# Patient Record
Sex: FEMALE | Race: OTHER | HISPANIC OR LATINO | URBAN - METROPOLITAN AREA
[De-identification: names, ages, dates, MRNs, and addresses within clinical notes are randomized per-mention and may not be internally consistent; named-entity substitution may affect disease eponyms.]

---

## 2020-07-18 ENCOUNTER — INPATIENT (INPATIENT)
Facility: HOSPITAL | Age: 35
LOS: 2 days | Discharge: HOME | End: 2020-07-21
Attending: OBSTETRICS & GYNECOLOGY | Admitting: OBSTETRICS & GYNECOLOGY
Payer: COMMERCIAL

## 2020-07-18 DIAGNOSIS — Z3A.00 WEEKS OF GESTATION OF PREGNANCY NOT SPECIFIED: ICD-10-CM

## 2020-07-18 DIAGNOSIS — O26.899 OTHER SPECIFIED PREGNANCY RELATED CONDITIONS, UNSPECIFIED TRIMESTER: ICD-10-CM

## 2020-07-18 LAB
APTT BLD: 28.9 SEC — SIGNIFICANT CHANGE UP (ref 27.5–35.5)
HCT VFR BLD CALC: 35.1 % — SIGNIFICANT CHANGE UP (ref 34.5–45)
HGB BLD-MCNC: 12 G/DL — SIGNIFICANT CHANGE UP (ref 11.5–15.5)
INR BLD: 0.98 RATIO — SIGNIFICANT CHANGE UP (ref 0.88–1.16)
MCHC RBC-ENTMCNC: 29.9 PG — SIGNIFICANT CHANGE UP (ref 27–34)
MCHC RBC-ENTMCNC: 34.2 GM/DL — SIGNIFICANT CHANGE UP (ref 32–36)
MCV RBC AUTO: 87.5 FL — SIGNIFICANT CHANGE UP (ref 80–100)
NRBC # BLD: 0 /100 WBCS — SIGNIFICANT CHANGE UP (ref 0–0)
PLATELET # BLD AUTO: 288 K/UL — SIGNIFICANT CHANGE UP (ref 150–400)
PROTHROM AB SERPL-ACNC: 11.5 SEC — SIGNIFICANT CHANGE UP (ref 10.6–13.6)
RBC # BLD: 4.01 M/UL — SIGNIFICANT CHANGE UP (ref 3.8–5.2)
RBC # FLD: 14.2 % — SIGNIFICANT CHANGE UP (ref 10.3–14.5)
WBC # BLD: 13.16 K/UL — HIGH (ref 3.8–10.5)
WBC # FLD AUTO: 13.16 K/UL — HIGH (ref 3.8–10.5)

## 2020-07-18 RX ORDER — SODIUM CHLORIDE 9 MG/ML
500 INJECTION INTRAMUSCULAR; INTRAVENOUS; SUBCUTANEOUS ONCE
Refills: 0 | Status: COMPLETED | OUTPATIENT
Start: 2020-07-18 | End: 2020-07-18

## 2020-07-18 RX ORDER — SODIUM CHLORIDE 9 MG/ML
3 INJECTION INTRAMUSCULAR; INTRAVENOUS; SUBCUTANEOUS EVERY 8 HOURS
Refills: 0 | Status: DISCONTINUED | OUTPATIENT
Start: 2020-07-18 | End: 2020-07-19

## 2020-07-18 RX ORDER — SODIUM CHLORIDE 9 MG/ML
1000 INJECTION INTRAMUSCULAR; INTRAVENOUS; SUBCUTANEOUS
Refills: 0 | Status: DISCONTINUED | OUTPATIENT
Start: 2020-07-18 | End: 2020-07-19

## 2020-07-18 RX ADMIN — SODIUM CHLORIDE 1500 MILLILITER(S): 9 INJECTION INTRAMUSCULAR; INTRAVENOUS; SUBCUTANEOUS at 22:36

## 2020-07-18 RX ADMIN — SODIUM CHLORIDE 200 MILLILITER(S): 9 INJECTION INTRAMUSCULAR; INTRAVENOUS; SUBCUTANEOUS at 22:56

## 2020-07-19 VITALS — WEIGHT: 224.87 LBS | HEIGHT: 68 IN

## 2020-07-19 DIAGNOSIS — Z34.80 ENCOUNTER FOR SUPERVISION OF OTHER NORMAL PREGNANCY, UNSPECIFIED TRIMESTER: ICD-10-CM

## 2020-07-19 LAB
ALBUMIN SERPL ELPH-MCNC: 3 G/DL — LOW (ref 3.5–5)
ALP SERPL-CCNC: 197 U/L — HIGH (ref 40–120)
ALT FLD-CCNC: 22 U/L DA — SIGNIFICANT CHANGE UP (ref 10–60)
ANION GAP SERPL CALC-SCNC: 8 MMOL/L — SIGNIFICANT CHANGE UP (ref 5–17)
AST SERPL-CCNC: 16 U/L — SIGNIFICANT CHANGE UP (ref 10–40)
BILIRUB SERPL-MCNC: 0.2 MG/DL — SIGNIFICANT CHANGE UP (ref 0.2–1.2)
BLD GP AB SCN SERPL QL: SIGNIFICANT CHANGE UP
BUN SERPL-MCNC: 10 MG/DL — SIGNIFICANT CHANGE UP (ref 7–18)
CALCIUM SERPL-MCNC: 9.4 MG/DL — SIGNIFICANT CHANGE UP (ref 8.4–10.5)
CHLORIDE SERPL-SCNC: 108 MMOL/L — SIGNIFICANT CHANGE UP (ref 96–108)
CO2 SERPL-SCNC: 22 MMOL/L — SIGNIFICANT CHANGE UP (ref 22–31)
CREAT ?TM UR-MCNC: 101 MG/DL — SIGNIFICANT CHANGE UP
CREAT SERPL-MCNC: 0.68 MG/DL — SIGNIFICANT CHANGE UP (ref 0.5–1.3)
GLUCOSE SERPL-MCNC: 96 MG/DL — SIGNIFICANT CHANGE UP (ref 70–99)
LDH SERPL L TO P-CCNC: 246 U/L — HIGH (ref 120–225)
POTASSIUM SERPL-MCNC: 3.8 MMOL/L — SIGNIFICANT CHANGE UP (ref 3.5–5.3)
POTASSIUM SERPL-SCNC: 3.8 MMOL/L — SIGNIFICANT CHANGE UP (ref 3.5–5.3)
PROT SERPL-MCNC: 7.5 G/DL — SIGNIFICANT CHANGE UP (ref 6–8.3)
SARS-COV-2 RNA SPEC QL NAA+PROBE: SIGNIFICANT CHANGE UP
SODIUM SERPL-SCNC: 138 MMOL/L — SIGNIFICANT CHANGE UP (ref 135–145)
URATE SERPL-MCNC: 5.8 MG/DL — SIGNIFICANT CHANGE UP (ref 2.5–7)

## 2020-07-19 PROCEDURE — 88307 TISSUE EXAM BY PATHOLOGIST: CPT | Mod: 26

## 2020-07-19 PROCEDURE — 74018 RADEX ABDOMEN 1 VIEW: CPT | Mod: 26

## 2020-07-19 RX ORDER — OXYCODONE HYDROCHLORIDE 5 MG/1
5 TABLET ORAL
Refills: 0 | Status: COMPLETED | OUTPATIENT
Start: 2020-07-19 | End: 2020-07-26

## 2020-07-19 RX ORDER — KETOROLAC TROMETHAMINE 30 MG/ML
30 SYRINGE (ML) INJECTION EVERY 6 HOURS
Refills: 0 | Status: DISCONTINUED | OUTPATIENT
Start: 2020-07-19 | End: 2020-07-19

## 2020-07-19 RX ORDER — LANOLIN
1 OINTMENT (GRAM) TOPICAL EVERY 6 HOURS
Refills: 0 | Status: DISCONTINUED | OUTPATIENT
Start: 2020-07-19 | End: 2020-07-21

## 2020-07-19 RX ORDER — IBUPROFEN 200 MG
600 TABLET ORAL EVERY 6 HOURS
Refills: 0 | Status: DISCONTINUED | OUTPATIENT
Start: 2020-07-20 | End: 2020-07-21

## 2020-07-19 RX ORDER — SIMETHICONE 80 MG/1
80 TABLET, CHEWABLE ORAL EVERY 4 HOURS
Refills: 0 | Status: DISCONTINUED | OUTPATIENT
Start: 2020-07-19 | End: 2020-07-21

## 2020-07-19 RX ORDER — TETANUS TOXOID, REDUCED DIPHTHERIA TOXOID AND ACELLULAR PERTUSSIS VACCINE, ADSORBED 5; 2.5; 8; 8; 2.5 [IU]/.5ML; [IU]/.5ML; UG/.5ML; UG/.5ML; UG/.5ML
0.5 SUSPENSION INTRAMUSCULAR ONCE
Refills: 0 | Status: DISCONTINUED | OUTPATIENT
Start: 2020-07-19 | End: 2020-07-21

## 2020-07-19 RX ORDER — CITRIC ACID/SODIUM CITRATE 300-500 MG
15 SOLUTION, ORAL ORAL EVERY 6 HOURS
Refills: 0 | Status: DISCONTINUED | OUTPATIENT
Start: 2020-07-19 | End: 2020-07-19

## 2020-07-19 RX ORDER — ACETAMINOPHEN 500 MG
975 TABLET ORAL
Refills: 0 | Status: DISCONTINUED | OUTPATIENT
Start: 2020-07-19 | End: 2020-07-21

## 2020-07-19 RX ORDER — MAGNESIUM HYDROXIDE 400 MG/1
30 TABLET, CHEWABLE ORAL
Refills: 0 | Status: DISCONTINUED | OUTPATIENT
Start: 2020-07-19 | End: 2020-07-21

## 2020-07-19 RX ORDER — ACETAMINOPHEN 500 MG
1000 TABLET ORAL ONCE
Refills: 0 | Status: COMPLETED | OUTPATIENT
Start: 2020-07-19 | End: 2020-07-19

## 2020-07-19 RX ORDER — DIPHENHYDRAMINE HCL 50 MG
25 CAPSULE ORAL EVERY 6 HOURS
Refills: 0 | Status: DISCONTINUED | OUTPATIENT
Start: 2020-07-19 | End: 2020-07-21

## 2020-07-19 RX ORDER — HEPARIN SODIUM 5000 [USP'U]/ML
5000 INJECTION INTRAVENOUS; SUBCUTANEOUS EVERY 12 HOURS
Refills: 0 | Status: DISCONTINUED | OUTPATIENT
Start: 2020-07-19 | End: 2020-07-19

## 2020-07-19 RX ORDER — HEPARIN SODIUM 5000 [USP'U]/ML
5000 INJECTION INTRAVENOUS; SUBCUTANEOUS EVERY 12 HOURS
Refills: 0 | Status: DISCONTINUED | OUTPATIENT
Start: 2020-07-19 | End: 2020-07-21

## 2020-07-19 RX ORDER — AZITHROMYCIN 500 MG/1
500 TABLET, FILM COATED ORAL ONCE
Refills: 0 | Status: COMPLETED | OUTPATIENT
Start: 2020-07-19 | End: 2020-07-19

## 2020-07-19 RX ORDER — OXYTOCIN 10 UNIT/ML
333.33 VIAL (ML) INJECTION
Qty: 20 | Refills: 0 | Status: DISCONTINUED | OUTPATIENT
Start: 2020-07-19 | End: 2020-07-21

## 2020-07-19 RX ORDER — OXYCODONE HYDROCHLORIDE 5 MG/1
5 TABLET ORAL
Refills: 0 | Status: DISCONTINUED | OUTPATIENT
Start: 2020-07-19 | End: 2020-07-21

## 2020-07-19 RX ORDER — SODIUM CHLORIDE 9 MG/ML
1000 INJECTION, SOLUTION INTRAVENOUS
Refills: 0 | Status: DISCONTINUED | OUTPATIENT
Start: 2020-07-19 | End: 2020-07-19

## 2020-07-19 RX ORDER — OXYCODONE HYDROCHLORIDE 5 MG/1
5 TABLET ORAL ONCE
Refills: 0 | Status: DISCONTINUED | OUTPATIENT
Start: 2020-07-19 | End: 2020-07-21

## 2020-07-19 RX ORDER — OXYTOCIN 10 UNIT/ML
2 VIAL (ML) INJECTION
Qty: 30 | Refills: 0 | Status: DISCONTINUED | OUTPATIENT
Start: 2020-07-19 | End: 2020-07-19

## 2020-07-19 RX ORDER — IBUPROFEN 200 MG
600 TABLET ORAL EVERY 6 HOURS
Refills: 0 | Status: COMPLETED | OUTPATIENT
Start: 2020-07-19 | End: 2021-06-17

## 2020-07-19 RX ORDER — SODIUM CHLORIDE 9 MG/ML
1000 INJECTION, SOLUTION INTRAVENOUS
Refills: 0 | Status: DISCONTINUED | OUTPATIENT
Start: 2020-07-19 | End: 2020-07-20

## 2020-07-19 RX ADMIN — SIMETHICONE 80 MILLIGRAM(S): 80 TABLET, CHEWABLE ORAL at 23:55

## 2020-07-19 RX ADMIN — Medication 975 MILLIGRAM(S): at 14:53

## 2020-07-19 RX ADMIN — Medication 1 TABLET(S): at 13:18

## 2020-07-19 RX ADMIN — AZITHROMYCIN 255 MILLIGRAM(S): 500 TABLET, FILM COATED ORAL at 06:38

## 2020-07-19 RX ADMIN — SIMETHICONE 80 MILLIGRAM(S): 80 TABLET, CHEWABLE ORAL at 18:22

## 2020-07-19 RX ADMIN — Medication 975 MILLIGRAM(S): at 20:45

## 2020-07-19 RX ADMIN — Medication 30 MILLIGRAM(S): at 07:15

## 2020-07-19 RX ADMIN — Medication 1000 MILLIUNIT(S)/MIN: at 03:59

## 2020-07-19 RX ADMIN — Medication 1000 MILLIGRAM(S): at 07:08

## 2020-07-19 RX ADMIN — Medication 400 MILLIGRAM(S): at 05:15

## 2020-07-19 RX ADMIN — MAGNESIUM HYDROXIDE 30 MILLILITER(S): 400 TABLET, CHEWABLE ORAL at 20:52

## 2020-07-19 RX ADMIN — Medication 30 MILLIGRAM(S): at 13:17

## 2020-07-19 RX ADMIN — SIMETHICONE 80 MILLIGRAM(S): 80 TABLET, CHEWABLE ORAL at 13:17

## 2020-07-19 RX ADMIN — Medication 30 MILLIGRAM(S): at 06:17

## 2020-07-19 RX ADMIN — Medication 30 MILLIGRAM(S): at 13:45

## 2020-07-19 RX ADMIN — Medication 975 MILLIGRAM(S): at 15:30

## 2020-07-19 RX ADMIN — Medication 30 MILLIGRAM(S): at 23:54

## 2020-07-19 RX ADMIN — Medication 975 MILLIGRAM(S): at 21:45

## 2020-07-19 RX ADMIN — HEPARIN SODIUM 5000 UNIT(S): 5000 INJECTION INTRAVENOUS; SUBCUTANEOUS at 14:53

## 2020-07-19 RX ADMIN — SODIUM CHLORIDE 125 MILLILITER(S): 9 INJECTION, SOLUTION INTRAVENOUS at 00:48

## 2020-07-19 RX ADMIN — Medication 30 MILLIGRAM(S): at 18:22

## 2020-07-19 RX ADMIN — Medication 30 MILLIGRAM(S): at 19:00

## 2020-07-19 NOTE — CHART NOTE - NSCHARTNOTEFT_GEN_A_CORE
Pt seen at bedside offers no complaints. Denies n/v, CP; SOB; palpitations; or dizziness    T(C): 36.7 (07-19-20 @ 05:42), Max: 37 (07-19-20 @ 03:30)  HR: 88 (07-19-20 @ 05:42) (76 - 92)  BP: 127/66 (07-19-20 @ 05:42) (115/56 - 144/65)  RR: 18 (07-19-20 @ 05:42) (18 - 18)  SpO2: 96% (07-19-20 @ 05:42) (96% - 99%)    abd: soft/nt, fundus firm, dressing in place C/D/I  pelvic: minimal lochia  ext: venodynes in place; no calf pain    A/P: POD #0 s/p primary c/s at 40 wks for cat 3 tracing   cont close monitor   cont post op care  d/w Dr. Aldana

## 2020-07-20 DIAGNOSIS — O13.9 GESTATIONAL [PREGNANCY-INDUCED] HYPERTENSION WITHOUT SIGNIFICANT PROTEINURIA, UNSPECIFIED TRIMESTER: ICD-10-CM

## 2020-07-20 LAB
BASOPHILS # BLD AUTO: 0.04 K/UL — SIGNIFICANT CHANGE UP (ref 0–0.2)
BASOPHILS NFR BLD AUTO: 0.3 % — SIGNIFICANT CHANGE UP (ref 0–2)
EOSINOPHIL # BLD AUTO: 0.12 K/UL — SIGNIFICANT CHANGE UP (ref 0–0.5)
EOSINOPHIL NFR BLD AUTO: 1 % — SIGNIFICANT CHANGE UP (ref 0–6)
HCT VFR BLD CALC: 34.4 % — LOW (ref 34.5–45)
HGB BLD-MCNC: 11.6 G/DL — SIGNIFICANT CHANGE UP (ref 11.5–15.5)
IMM GRANULOCYTES NFR BLD AUTO: 0.5 % — SIGNIFICANT CHANGE UP (ref 0–1.5)
LYMPHOCYTES # BLD AUTO: 25.9 % — SIGNIFICANT CHANGE UP (ref 13–44)
LYMPHOCYTES # BLD AUTO: 3.08 K/UL — SIGNIFICANT CHANGE UP (ref 1–3.3)
MCHC RBC-ENTMCNC: 30.3 PG — SIGNIFICANT CHANGE UP (ref 27–34)
MCHC RBC-ENTMCNC: 33.7 GM/DL — SIGNIFICANT CHANGE UP (ref 32–36)
MCV RBC AUTO: 89.8 FL — SIGNIFICANT CHANGE UP (ref 80–100)
MONOCYTES # BLD AUTO: 0.63 K/UL — SIGNIFICANT CHANGE UP (ref 0–0.9)
MONOCYTES NFR BLD AUTO: 5.3 % — SIGNIFICANT CHANGE UP (ref 2–14)
NEUTROPHILS # BLD AUTO: 7.98 K/UL — HIGH (ref 1.8–7.4)
NEUTROPHILS NFR BLD AUTO: 67 % — SIGNIFICANT CHANGE UP (ref 43–77)
NRBC # BLD: 0 /100 WBCS — SIGNIFICANT CHANGE UP (ref 0–0)
PLATELET # BLD AUTO: 262 K/UL — SIGNIFICANT CHANGE UP (ref 150–400)
RBC # BLD: 3.83 M/UL — SIGNIFICANT CHANGE UP (ref 3.8–5.2)
RBC # FLD: 14.9 % — HIGH (ref 10.3–14.5)
T PALLIDUM AB TITR SER: NEGATIVE — SIGNIFICANT CHANGE UP
WBC # BLD: 11.91 K/UL — HIGH (ref 3.8–10.5)
WBC # FLD AUTO: 11.91 K/UL — HIGH (ref 3.8–10.5)

## 2020-07-20 RX ADMIN — Medication 975 MILLIGRAM(S): at 09:30

## 2020-07-20 RX ADMIN — Medication 600 MILLIGRAM(S): at 18:39

## 2020-07-20 RX ADMIN — Medication 975 MILLIGRAM(S): at 04:00

## 2020-07-20 RX ADMIN — Medication 30 MILLIGRAM(S): at 00:23

## 2020-07-20 RX ADMIN — Medication 975 MILLIGRAM(S): at 18:36

## 2020-07-20 RX ADMIN — Medication 975 MILLIGRAM(S): at 19:15

## 2020-07-20 RX ADMIN — Medication 600 MILLIGRAM(S): at 06:04

## 2020-07-20 RX ADMIN — Medication 975 MILLIGRAM(S): at 03:05

## 2020-07-20 RX ADMIN — Medication 600 MILLIGRAM(S): at 19:15

## 2020-07-20 RX ADMIN — Medication 1 TABLET(S): at 13:03

## 2020-07-20 RX ADMIN — Medication 975 MILLIGRAM(S): at 23:59

## 2020-07-20 RX ADMIN — HEPARIN SODIUM 5000 UNIT(S): 5000 INJECTION INTRAVENOUS; SUBCUTANEOUS at 18:33

## 2020-07-20 RX ADMIN — Medication 975 MILLIGRAM(S): at 08:34

## 2020-07-20 RX ADMIN — Medication 600 MILLIGRAM(S): at 13:03

## 2020-07-20 RX ADMIN — HEPARIN SODIUM 5000 UNIT(S): 5000 INJECTION INTRAVENOUS; SUBCUTANEOUS at 03:09

## 2020-07-20 RX ADMIN — SIMETHICONE 80 MILLIGRAM(S): 80 TABLET, CHEWABLE ORAL at 06:04

## 2020-07-20 NOTE — PROGRESS NOTE ADULT - SUBJECTIVE AND OBJECTIVE BOX
[facility] Progress Note POD#1    Patient seen at bedside resting comfortably offers no new complaints. + Ambulation, due to void,  + flatus; tolerating regular diet. both breast and bottle feeding. Denies HA, CP, SOB, N/V/D,  no bm; dizziness, palpitations, worsening abdominal pain, worsening vaginal bleeding, or any other concerns.     Vital Signs Last 24 Hrs  T(C): 36.7 (20 Jul 2020 06:00), Max: 37 (19 Jul 2020 17:06)  T(F): 98.1 (20 Jul 2020 06:00), Max: 98.6 (19 Jul 2020 17:06)  HR: 67 (20 Jul 2020 06:00) (67 - 77)  BP: 110/73 (20 Jul 2020 06:00) (108/68 - 119/73)  BP(mean): 85 (20 Jul 2020 06:00) (82 - 86)  RR: 16 (20 Jul 2020 06:00) (16 - 18)  SpO2: 97% (20 Jul 2020 06:00) (97% - 99%)    Gen: A&O x 3, NAD  Chest: CTA B/L  Cardiac: S1,S2  RRR  Breast: Soft, nontender, nonengorged  Abdomen: +BS; soft; Nontender, nondistended; dressing removed incision C/D/I, steri strips in place, fundus firm  Gyn: minimal lochia   Extremities: Nontender, no worsening edema, +1 dtr b/l OB PA Progress Note POD#1    Patient seen at bedside resting comfortably offers no new complaints. + Ambulation, due to void,  + flatus; tolerating regular diet. both breast and bottle feeding. Denies HA, CP, SOB, N/V/D,  no bm; dizziness, palpitations, worsening abdominal pain, worsening vaginal bleeding, or any other concerns.     Vital Signs Last 24 Hrs  T(C): 36.7 (20 Jul 2020 06:00), Max: 37 (19 Jul 2020 17:06)  T(F): 98.1 (20 Jul 2020 06:00), Max: 98.6 (19 Jul 2020 17:06)  HR: 67 (20 Jul 2020 06:00) (67 - 77)  BP: 110/73 (20 Jul 2020 06:00) (108/68 - 119/73)  BP(mean): 85 (20 Jul 2020 06:00) (82 - 86)  RR: 16 (20 Jul 2020 06:00) (16 - 18)  SpO2: 97% (20 Jul 2020 06:00) (97% - 99%)    Gen: A&O x 3, NAD  Chest: CTA B/L  Cardiac: S1,S2  RRR  Breast: Soft, nontender, nonengorged  Abdomen: +BS; soft; Nontender, nondistended; dressing removed incision C/D/I, steri strips in place, fundus firm  Gyn: minimal lochia   Extremities: Nontender, no worsening edema, +1 dtr b/l

## 2020-07-20 NOTE — PROGRESS NOTE ADULT - PROBLEM SELECTOR PLAN 1
A/P: 34y/o POD #1 s/p stat primary c/s catIII GHTN, covid neg stable   -Pain management, postop care  -OOB and ambulate  -f/u Rpt CBC   - f/u void  -continue regular diet  -Encourage breastfeeding   -continue BP q4hrs, case mngt  -d/w Dr. Callahan [place] attending

## 2020-07-20 NOTE — PROGRESS NOTE ADULT - ASSESSMENT
A/P: 34y/o POD #1 s/p stat primary c/s catIII GHTN, covid neg stable   -Pain management, postop care  -OOB and ambulate  -f/u Rpt CBC   - f/u void  -continue regular diet  -Encourage breastfeeding   -continue BP q4hrs, case mngt  -d/w Dr. Callahan [place] attending

## 2020-07-21 VITALS
OXYGEN SATURATION: 97 % | HEART RATE: 62 BPM | RESPIRATION RATE: 16 BRPM | DIASTOLIC BLOOD PRESSURE: 70 MMHG | TEMPERATURE: 98 F | SYSTOLIC BLOOD PRESSURE: 111 MMHG

## 2020-07-21 LAB
BASOPHILS # BLD AUTO: 0.05 K/UL — SIGNIFICANT CHANGE UP (ref 0–0.2)
BASOPHILS NFR BLD AUTO: 0.5 % — SIGNIFICANT CHANGE UP (ref 0–2)
EOSINOPHIL # BLD AUTO: 0.26 K/UL — SIGNIFICANT CHANGE UP (ref 0–0.5)
EOSINOPHIL NFR BLD AUTO: 2.4 % — SIGNIFICANT CHANGE UP (ref 0–6)
HCT VFR BLD CALC: 33.1 % — LOW (ref 34.5–45)
HGB BLD-MCNC: 11 G/DL — LOW (ref 11.5–15.5)
IMM GRANULOCYTES NFR BLD AUTO: 0.7 % — SIGNIFICANT CHANGE UP (ref 0–1.5)
LYMPHOCYTES # BLD AUTO: 3.6 K/UL — HIGH (ref 1–3.3)
LYMPHOCYTES # BLD AUTO: 33.6 % — SIGNIFICANT CHANGE UP (ref 13–44)
MCHC RBC-ENTMCNC: 30.1 PG — SIGNIFICANT CHANGE UP (ref 27–34)
MCHC RBC-ENTMCNC: 33.2 GM/DL — SIGNIFICANT CHANGE UP (ref 32–36)
MCV RBC AUTO: 90.4 FL — SIGNIFICANT CHANGE UP (ref 80–100)
MONOCYTES # BLD AUTO: 0.86 K/UL — SIGNIFICANT CHANGE UP (ref 0–0.9)
MONOCYTES NFR BLD AUTO: 8 % — SIGNIFICANT CHANGE UP (ref 2–14)
NEUTROPHILS # BLD AUTO: 5.86 K/UL — SIGNIFICANT CHANGE UP (ref 1.8–7.4)
NEUTROPHILS NFR BLD AUTO: 54.8 % — SIGNIFICANT CHANGE UP (ref 43–77)
NRBC # BLD: 0 /100 WBCS — SIGNIFICANT CHANGE UP (ref 0–0)
PLATELET # BLD AUTO: 307 K/UL — SIGNIFICANT CHANGE UP (ref 150–400)
RBC # BLD: 3.66 M/UL — LOW (ref 3.8–5.2)
RBC # FLD: 14.5 % — SIGNIFICANT CHANGE UP (ref 10.3–14.5)
WBC # BLD: 10.7 K/UL — HIGH (ref 3.8–10.5)
WBC # FLD AUTO: 10.7 K/UL — HIGH (ref 3.8–10.5)

## 2020-07-21 PROCEDURE — 85730 THROMBOPLASTIN TIME PARTIAL: CPT

## 2020-07-21 PROCEDURE — 59050 FETAL MONITOR W/REPORT: CPT

## 2020-07-21 PROCEDURE — 87340 HEPATITIS B SURFACE AG IA: CPT

## 2020-07-21 PROCEDURE — 86780 TREPONEMA PALLIDUM: CPT

## 2020-07-21 PROCEDURE — 36415 COLL VENOUS BLD VENIPUNCTURE: CPT

## 2020-07-21 PROCEDURE — 86703 HIV-1/HIV-2 1 RESULT ANTBDY: CPT

## 2020-07-21 PROCEDURE — 87635 SARS-COV-2 COVID-19 AMP PRB: CPT

## 2020-07-21 PROCEDURE — G0463: CPT

## 2020-07-21 PROCEDURE — 59025 FETAL NON-STRESS TEST: CPT

## 2020-07-21 PROCEDURE — 84550 ASSAY OF BLOOD/URIC ACID: CPT

## 2020-07-21 PROCEDURE — 74018 RADEX ABDOMEN 1 VIEW: CPT

## 2020-07-21 PROCEDURE — 80053 COMPREHEN METABOLIC PANEL: CPT

## 2020-07-21 PROCEDURE — 76819 FETAL BIOPHYS PROFIL W/O NST: CPT

## 2020-07-21 PROCEDURE — 86769 SARS-COV-2 COVID-19 ANTIBODY: CPT

## 2020-07-21 PROCEDURE — 88307 TISSUE EXAM BY PATHOLOGIST: CPT

## 2020-07-21 PROCEDURE — 86762 RUBELLA ANTIBODY: CPT

## 2020-07-21 PROCEDURE — 86592 SYPHILIS TEST NON-TREP QUAL: CPT

## 2020-07-21 PROCEDURE — 86901 BLOOD TYPING SEROLOGIC RH(D): CPT

## 2020-07-21 PROCEDURE — 85027 COMPLETE CBC AUTOMATED: CPT

## 2020-07-21 PROCEDURE — 85610 PROTHROMBIN TIME: CPT

## 2020-07-21 PROCEDURE — 86850 RBC ANTIBODY SCREEN: CPT

## 2020-07-21 PROCEDURE — 83615 LACTATE (LD) (LDH) ENZYME: CPT

## 2020-07-21 PROCEDURE — 82570 ASSAY OF URINE CREATININE: CPT

## 2020-07-21 PROCEDURE — 76805 OB US >/= 14 WKS SNGL FETUS: CPT

## 2020-07-21 PROCEDURE — 86900 BLOOD TYPING SEROLOGIC ABO: CPT

## 2020-07-21 RX ORDER — IBUPROFEN 200 MG
1 TABLET ORAL
Qty: 20 | Refills: 0
Start: 2020-07-21 | End: 2020-07-25

## 2020-07-21 RX ORDER — ACETAMINOPHEN 500 MG
2 TABLET ORAL
Qty: 40 | Refills: 0
Start: 2020-07-21 | End: 2020-07-25

## 2020-07-21 RX ADMIN — SIMETHICONE 80 MILLIGRAM(S): 80 TABLET, CHEWABLE ORAL at 05:51

## 2020-07-21 RX ADMIN — Medication 600 MILLIGRAM(S): at 08:02

## 2020-07-21 RX ADMIN — Medication 600 MILLIGRAM(S): at 00:36

## 2020-07-21 RX ADMIN — MAGNESIUM HYDROXIDE 30 MILLILITER(S): 400 TABLET, CHEWABLE ORAL at 00:03

## 2020-07-21 RX ADMIN — Medication 975 MILLIGRAM(S): at 06:30

## 2020-07-21 RX ADMIN — Medication 975 MILLIGRAM(S): at 00:38

## 2020-07-21 RX ADMIN — Medication 1 TABLET(S): at 12:35

## 2020-07-21 RX ADMIN — Medication 975 MILLIGRAM(S): at 12:35

## 2020-07-21 RX ADMIN — Medication 600 MILLIGRAM(S): at 09:00

## 2020-07-21 RX ADMIN — SIMETHICONE 80 MILLIGRAM(S): 80 TABLET, CHEWABLE ORAL at 00:03

## 2020-07-21 RX ADMIN — Medication 975 MILLIGRAM(S): at 05:53

## 2020-07-21 RX ADMIN — HEPARIN SODIUM 5000 UNIT(S): 5000 INJECTION INTRAVENOUS; SUBCUTANEOUS at 05:52

## 2020-07-21 RX ADMIN — Medication 600 MILLIGRAM(S): at 00:03

## 2020-07-21 NOTE — DISCHARGE NOTE OB - CARE PROVIDER_API CALL
Lizzy Corcoran  OBSTETRICS AND GYNECOLOGY  [address]  [place] [address]  Phone: [phone]  Fax: [phone]  Follow Up Time:

## 2020-07-21 NOTE — DISCHARGE NOTE OB - CARE PLAN
Principal Discharge DX:	 delivery delivered  Goal:	Continue breastfeeding.  Motrin as needed for pain.  Ambulate daily.  No heavy lifting or anything per vagina x 6 weeks - no sex, tampons, douching, tub baths, etc.  Follow up in office in 1-2 weeks for incision check, and then at 6 weeks for postpartum check.  Assessment and plan of treatment:	Continue breastfeeding.  Motrin as needed for pain.  Ambulate daily.  No heavy lifting or anything per vagina x 6 weeks - no sex, tampons, douching, tub baths, etc.  Follow up in office in 1-2 weeks for incision check, and then at 6 weeks for postpartum check.  Secondary Diagnosis:	Gestational hypertension  Assessment and plan of treatment:	follow-up with clinician in 1 week for blood pressure check, return to Er if headache, epigastric pain, visual changes or increased swelling
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
## 2020-07-21 NOTE — DISCHARGE NOTE OB - PLAN OF CARE
Continue breastfeeding.  Motrin as needed for pain.  Ambulate daily.  No heavy lifting or anything per vagina x 6 weeks - no sex, tampons, douching, tub baths, etc.  Follow up in office in 1-2 weeks for incision check, and then at 6 weeks for postpartum check. follow-up with clinician in 1 week for blood pressure check, return to Er if headache, epigastric pain, visual changes or increased swelling

## 2020-07-21 NOTE — DISCHARGE NOTE OB - MEDICATION SUMMARY - MEDICATIONS TO TAKE
I will START or STAY ON the medications listed below when I get home from the hospital:    blood pressure kit  -- Apply on skin to affected area 3 times a day   -- Indication: For Gestational hypertension    ibuprofen 600 mg oral tablet  -- 1 tab(s) by mouth every 6 hours   -- Do not take this drug if you are pregnant.  It is very important that you take or use this exactly as directed.  Do not skip doses or discontinue unless directed by your doctor.  May cause drowsiness or dizziness.  Obtain medical advice before taking any non-prescription drugs as some may affect the action of this medication.  Take with food or milk.    -- Indication: For  delivery delivered    acetaminophen 325 mg oral capsule  -- 2 cap(s) by mouth every 6 hours   -- Indication: For  delivery delivered    Prenatal Multivitamins with Folic Acid 1 mg oral tablet  -- 1 tab(s) by mouth once a day  -- Indication: For  delivery delivered

## 2020-07-21 NOTE — PROGRESS NOTE ADULT - PROBLEM SELECTOR PLAN 1
-d/c home   -instructions verbalized  -follow up in 1-2weeks in office for incision check  -d/w Dr. Radford

## 2020-07-21 NOTE — DISCHARGE NOTE OB - PATIENT PORTAL LINK FT
You can access the FollowMyHealth Patient Portal offered by [facility] by registering at the following website: [email or web address]. By joining [facility]’s FollowMyHealth portal, you will also be able to view your health information using other applications (apps) compatible with our system.
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
## 2020-07-21 NOTE — PROGRESS NOTE ADULT - SUBJECTIVE AND OBJECTIVE BOX
Patient seen at bedside resting comfortably, offers no new complaints. + Ambulation, + void without difficulty, + flatus;  + BM;  tolerating regular diet. Both breastfeeding and bottle feeding. Denies HA, blurry vision or epigastric pain, CP, SOB, N/V/D,  dizziness, palpitations, worsening vaginal bleeding.     Vital Signs Last 24 Hrs  T(C): 36.6 (21 Jul 2020 06:37), Max: 36.8 (20 Jul 2020 14:15)  T(F): 97.8 (21 Jul 2020 06:37), Max: 98.3 (21 Jul 2020 03:00)  HR: 62 (21 Jul 2020 06:37) (62 - 81)  BP: 111/70 (21 Jul 2020 06:37) (103/62 - 123/72)  BP(mean): --  RR: 16 (21 Jul 2020 06:37) (16 - 18)  SpO2: 97% (21 Jul 2020 06:37) (96% - 99%)    Gen: A&O x 3, NAD  Chest: CTA B/L  Cardiac: S1,S2  RRR  Breast: Soft, nontender, nonengorged  Abdomen: +BS; soft; Nontender, nondistended, Incision C/D/I steri strips in place   Gyn: Minimal lochia  Extremities: Nontender, DTRS 2+, no worsening edema                          11.0   10.70 )-----------( 307      ( 21 Jul 2020 06:48 )             33.1

## 2021-10-21 ENCOUNTER — HOSPITAL ENCOUNTER (EMERGENCY)
Dept: HOSPITAL 53 - M ED | Age: 36
Discharge: HOME | End: 2021-10-21
Payer: OTHER GOVERNMENT

## 2021-10-21 VITALS — DIASTOLIC BLOOD PRESSURE: 71 MMHG | SYSTOLIC BLOOD PRESSURE: 145 MMHG

## 2021-10-21 VITALS — HEIGHT: 68 IN | BODY MASS INDEX: 30.38 KG/M2 | WEIGHT: 200.42 LBS

## 2021-10-21 DIAGNOSIS — Z04.1: Primary | ICD-10-CM

## 2021-10-21 DIAGNOSIS — T14.8XXA: ICD-10-CM
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
## 2021-10-21 LAB
ALBUMIN SERPL BCG-MCNC: 3.6 GM/DL (ref 3.2–5.2)
ALT SERPL W P-5'-P-CCNC: 24 U/L (ref 12–78)
AMPHETAMINES UR QL SCN: NEGATIVE
AMYLASE SERPL-CCNC: 41 U/L (ref 25–115)
APPEARANCE UR: (no result)
BACTERIA UR QL AUTO: (no result)
BARBITURATES UR QL SCN: NEGATIVE
BASOPHILS # BLD AUTO: 0 10^3/UL (ref 0–0.2)
BASOPHILS NFR BLD AUTO: 0.3 % (ref 0–1)
BENZODIAZ UR QL SCN: NEGATIVE
BILIRUB CONJ SERPL-MCNC: 0.2 MG/DL (ref 0–0.2)
BILIRUB SERPL-MCNC: 0.5 MG/DL (ref 0.2–1)
BILIRUB UR QL STRIP.AUTO: NEGATIVE
BZE UR QL SCN: NEGATIVE
CANNABINOIDS UR QL SCN: NEGATIVE
CK MB CFR.DF SERPL CALC: 0.66
CK MB SERPL-MCNC: < 1 NG/ML (ref ?–3.6)
CK SERPL-CCNC: 152 U/L (ref 26–192)
EOSINOPHIL # BLD AUTO: 0.1 10^3/UL (ref 0–0.5)
EOSINOPHIL NFR BLD AUTO: 0.8 % (ref 0–3)
ETHANOL SERPL-MCNC: < 0.003 % (ref 0–0.01)
GLUCOSE UR QL STRIP.AUTO: NEGATIVE MG/DL
HCT VFR BLD AUTO: 36.5 % (ref 36–47)
HGB BLD-MCNC: 12.4 G/DL (ref 12–15.5)
HGB UR QL STRIP.AUTO: NEGATIVE
KETONES UR QL STRIP.AUTO: NEGATIVE MG/DL
LEUKOCYTE ESTERASE UR QL STRIP.AUTO: (no result)
LIPASE SERPL-CCNC: 142 U/L (ref 73–393)
LYMPHOCYTES # BLD AUTO: 2.9 10^3/UL (ref 1.5–5)
LYMPHOCYTES NFR BLD AUTO: 22 % (ref 24–44)
MCH RBC QN AUTO: 31.6 PG (ref 27–33)
MCHC RBC AUTO-ENTMCNC: 34 G/DL (ref 32–36.5)
MCV RBC AUTO: 92.9 FL (ref 80–96)
METHADONE UR QL SCN: NEGATIVE
MONOCYTES # BLD AUTO: 0.9 10^3/UL (ref 0–0.8)
MONOCYTES NFR BLD AUTO: 6.9 % (ref 2–8)
NEUTROPHILS # BLD AUTO: 9.3 10^3/UL (ref 1.5–8.5)
NEUTROPHILS NFR BLD AUTO: 69.8 % (ref 36–66)
NITRITE UR QL STRIP.AUTO: NEGATIVE
OPIATES UR QL SCN: NEGATIVE
PCP UR QL SCN: NEGATIVE
PH UR STRIP.AUTO: 6 UNITS (ref 5–9)
PLATELET # BLD AUTO: 295 10^3/UL (ref 150–450)
PROT SERPL-MCNC: 7.1 GM/DL (ref 6.4–8.2)
PROT UR QL STRIP.AUTO: NEGATIVE MG/DL
RBC # BLD AUTO: 3.93 10^6/UL (ref 4–5.4)
RBC # UR AUTO: 4 /HPF (ref 0–3)
SP GR UR STRIP.AUTO: 1.01 (ref 1–1.03)
SQUAMOUS #/AREA URNS AUTO: 17 /HPF (ref 0–6)
TROPONIN I SERPL-MCNC: < 0.02 NG/ML (ref ?–0.1)
UROBILINOGEN UR QL STRIP.AUTO: 0.2 MG/DL (ref 0–2)
WBC # BLD AUTO: 13.4 10^3/UL (ref 4–10)
WBC #/AREA URNS AUTO: 5 /HPF (ref 0–3)

## 2021-10-21 PROCEDURE — 71260 CT THORAX DX C+: CPT

## 2021-10-21 PROCEDURE — 70450 CT HEAD/BRAIN W/O DYE: CPT

## 2021-10-21 PROCEDURE — 80076 HEPATIC FUNCTION PANEL: CPT

## 2021-10-21 PROCEDURE — 85025 COMPLETE CBC W/AUTO DIFF WBC: CPT

## 2021-10-21 PROCEDURE — 94760 N-INVAS EAR/PLS OXIMETRY 1: CPT

## 2021-10-21 PROCEDURE — 36415 COLL VENOUS BLD VENIPUNCTURE: CPT

## 2021-10-21 PROCEDURE — 73590 X-RAY EXAM OF LOWER LEG: CPT

## 2021-10-21 PROCEDURE — 80307 DRUG TEST PRSMV CHEM ANLYZR: CPT

## 2021-10-21 PROCEDURE — 82550 ASSAY OF CK (CPK): CPT

## 2021-10-21 PROCEDURE — 84484 ASSAY OF TROPONIN QUANT: CPT

## 2021-10-21 PROCEDURE — 81001 URINALYSIS AUTO W/SCOPE: CPT

## 2021-10-21 PROCEDURE — 83690 ASSAY OF LIPASE: CPT

## 2021-10-21 PROCEDURE — 83605 ASSAY OF LACTIC ACID: CPT

## 2021-10-21 PROCEDURE — 73502 X-RAY EXAM HIP UNI 2-3 VIEWS: CPT

## 2021-10-21 PROCEDURE — 99284 EMERGENCY DEPT VISIT MOD MDM: CPT

## 2021-10-21 PROCEDURE — 74177 CT ABD & PELVIS W/CONTRAST: CPT

## 2021-10-21 PROCEDURE — 72125 CT NECK SPINE W/O DYE: CPT

## 2021-10-21 PROCEDURE — 86900 BLOOD TYPING SEROLOGIC ABO: CPT

## 2021-10-21 PROCEDURE — 86901 BLOOD TYPING SEROLOGIC RH(D): CPT

## 2021-10-21 PROCEDURE — 82077 ASSAY SPEC XCP UR&BREATH IA: CPT

## 2021-10-21 PROCEDURE — 80047 BASIC METABLC PNL IONIZED CA: CPT

## 2021-10-21 PROCEDURE — 82150 ASSAY OF AMYLASE: CPT

## 2021-10-21 PROCEDURE — 86850 RBC ANTIBODY SCREEN: CPT

## 2021-10-21 PROCEDURE — 93041 RHYTHM ECG TRACING: CPT

## 2021-10-21 PROCEDURE — 84702 CHORIONIC GONADOTROPIN TEST: CPT

## 2021-10-21 PROCEDURE — 82553 CREATINE MB FRACTION: CPT
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
